# Patient Record
Sex: MALE | Race: WHITE | NOT HISPANIC OR LATINO
[De-identification: names, ages, dates, MRNs, and addresses within clinical notes are randomized per-mention and may not be internally consistent; named-entity substitution may affect disease eponyms.]

---

## 2022-08-08 PROBLEM — Z00.00 ENCOUNTER FOR PREVENTIVE HEALTH EXAMINATION: Status: ACTIVE | Noted: 2022-08-08

## 2022-08-10 ENCOUNTER — APPOINTMENT (OUTPATIENT)
Dept: ORTHOPEDIC SURGERY | Facility: CLINIC | Age: 37
End: 2022-08-10

## 2022-08-10 VITALS — BODY MASS INDEX: 21.17 KG/M2 | HEIGHT: 74 IN | WEIGHT: 165 LBS

## 2022-08-10 DIAGNOSIS — S73.004S UNSPECIFIED DISLOCATION OF RIGHT HIP, SEQUELA: ICD-10-CM

## 2022-08-10 DIAGNOSIS — M25.572 PAIN IN RIGHT ANKLE AND JOINTS OF RIGHT FOOT: ICD-10-CM

## 2022-08-10 DIAGNOSIS — M25.571 PAIN IN RIGHT ANKLE AND JOINTS OF RIGHT FOOT: ICD-10-CM

## 2022-08-10 DIAGNOSIS — Z78.9 OTHER SPECIFIED HEALTH STATUS: ICD-10-CM

## 2022-08-10 DIAGNOSIS — Z72.0 TOBACCO USE: ICD-10-CM

## 2022-08-10 PROCEDURE — 99203 OFFICE O/P NEW LOW 30 MIN: CPT

## 2022-08-10 RX ORDER — IBUPROFEN 600 MG/1
600 TABLET, FILM COATED ORAL
Qty: 28 | Refills: 0 | Status: COMPLETED | COMMUNITY
Start: 2022-08-07

## 2022-08-12 NOTE — HISTORY OF PRESENT ILLNESS
[de-identified] : 37M RHD. PMHX Pulled RT inner thigh/ groin muscle 2019. Felt like he "dislocated his hip" when getting up from chair - no trauma. Went to [place] on 8/7/22 For groin/ hip pain that seems to be progressively getting worse since the injury. States he has been compensating more with the LT. He was also seen for his ankles. They did xrays of both and found no fractures. He was told to follow up with ortho. No numbness/tingling.

## 2022-08-12 NOTE — IMAGING
[de-identified] : Bilateral hips with no swelling. +ttp at groin. Able to ambulate, +ttp at Achilles. +TA, EHL, GA. SILT throughout.\par \par Bilateral hip radiographs from [facility] with no fracture nor dislocation. No arthrosis.

## 2022-08-12 NOTE — ASSESSMENT
[FreeTextEntry1] : Bilateral adductor hip strain and Achilles tendonitis - reviewed radiographs and pathoanatomy with patient and discussed we will pursue PT. NSAIDs prn, WBAT.\par \par F/u prn

## 2022-08-18 ENCOUNTER — APPOINTMENT (OUTPATIENT)
Dept: ORTHOPEDIC SURGERY | Facility: CLINIC | Age: 37
End: 2022-08-18
Payer: COMMERCIAL

## 2022-08-18 VITALS — HEIGHT: 74 IN | BODY MASS INDEX: 21.17 KG/M2 | WEIGHT: 165 LBS

## 2022-08-18 PROCEDURE — 99203 OFFICE O/P NEW LOW 30 MIN: CPT

## 2022-08-18 PROCEDURE — 99213 OFFICE O/P EST LOW 20 MIN: CPT

## 2022-08-18 NOTE — HISTORY OF PRESENT ILLNESS
[de-identified] : 08/10/2022 Saw Dieter Fuller -- 37M RHD. PMHX Pulled RT inner thigh/ groin muscle 2019. Felt like he "dislocated his hip" when getting up from chair - no trauma. Went to [place] on 8/7/22 For groin/ hip pain that seems to be progressively getting worse since the injury. States he has been compensating more with the LT. He was also seen for his ankles. They did xrays of both and found no fractures. He was told to follow up with ortho. No numbness/tingling.\par \par 08/18/2022 Felt pain from his big toe on the rt foot to the heel. Was sent to pt per last visit but Pt was not comfortable working on him without additional imaging because the pain tends to move around. Feels pulling on his groin that makes it hard to urinate when it's bothering him.

## 2022-08-18 NOTE — ASSESSMENT
[FreeTextEntry1] : Bilateral adductor hip strain and Achilles tendonitis - reviewed radiographs and pathoanatomy with patient and discussed we will pursue PT. NSAIDs prn, WBAT.\par \par Will obtain MRI of the right hip w/o contrast.\par \par f/u after MRI.

## 2022-08-18 NOTE — IMAGING
[de-identified] : 08/18/2022 's findings Bilateral hips with no swelling. +ttp at groin. Able to ambulate, +ttp at Achilles. +TA, EHL, GA. SILT throughout.\par Bilateral hip radiographs from [facility] with no fracture nor dislocation. No arthrosis.

## 2022-08-23 ENCOUNTER — FORM ENCOUNTER (OUTPATIENT)
Age: 37
End: 2022-08-23

## 2022-08-24 ENCOUNTER — APPOINTMENT (OUTPATIENT)
Dept: MRI IMAGING | Facility: CLINIC | Age: 37
End: 2022-08-24

## 2022-08-24 PROCEDURE — 73721 MRI JNT OF LWR EXTRE W/O DYE: CPT | Mod: RT

## 2022-08-29 ENCOUNTER — APPOINTMENT (OUTPATIENT)
Dept: ORTHOPEDIC SURGERY | Facility: CLINIC | Age: 37
End: 2022-08-29

## 2022-08-29 VITALS — HEIGHT: 74 IN | BODY MASS INDEX: 21.17 KG/M2 | WEIGHT: 165 LBS

## 2022-08-29 DIAGNOSIS — S33.8XXA SPRAIN OF OTHER PARTS OF LUMBAR SPINE AND PELVIS, INITIAL ENCOUNTER: ICD-10-CM

## 2022-08-29 DIAGNOSIS — M76.60 ACHILLES TENDINITIS, UNSPECIFIED LEG: ICD-10-CM

## 2022-08-29 PROCEDURE — 99214 OFFICE O/P EST MOD 30 MIN: CPT

## 2022-08-29 NOTE — HISTORY OF PRESENT ILLNESS
[de-identified] : 08/10/2022 Saw Dieter Fuller -- 37M RHD. PMHX Pulled RT inner thigh/ groin muscle 2019. Felt like he "dislocated his hip" when getting up from chair - no trauma. Went to [place] on 8/7/22 For groin/ hip pain that seems to be progressively getting worse since the injury. States he has been compensating more with the LT. He was also seen for his ankles. They did xrays of both and found no fractures. He was told to follow up with ortho. No numbness/tingling.\par \par 08/18/2022 Felt pain from his big toe on the rt foot to the heel. Was sent to pt per last visit but Pt was not comfortable working on him without additional imaging because the pain tends to move around. Feels pulling on his groin that makes it hard to urinate when it's bothering him. \par \par 8/29/22 Follow up right hip MRI Results at [facility]. Currently experiencing pain from sitting while waiting. Reports his right leg has been numb for the past week. Experiencing pain and pulling sensation in his penis that is getting worse. Has noticed bruising on his penis for 2-3 days. Has occasional pain while urinating and has felt pain in his urethra. Feels pulling in bilateral groin. Denies pain medication.

## 2022-09-02 ENCOUNTER — APPOINTMENT (OUTPATIENT)
Dept: MRI IMAGING | Facility: CLINIC | Age: 37
End: 2022-09-02

## 2022-09-09 ENCOUNTER — APPOINTMENT (OUTPATIENT)
Dept: ORTHOPEDIC SURGERY | Facility: CLINIC | Age: 37
End: 2022-09-09

## 2022-09-13 ENCOUNTER — FORM ENCOUNTER (OUTPATIENT)
Age: 37
End: 2022-09-13

## 2022-09-14 ENCOUNTER — APPOINTMENT (OUTPATIENT)
Dept: MRI IMAGING | Facility: CLINIC | Age: 37
End: 2022-09-14

## 2022-09-14 PROCEDURE — 73723 MRI JOINT LWR EXTR W/O&W/DYE: CPT | Mod: RT

## 2022-09-19 ENCOUNTER — APPOINTMENT (OUTPATIENT)
Dept: ORTHOPEDIC SURGERY | Facility: CLINIC | Age: 37
End: 2022-09-19

## 2022-09-19 VITALS — HEIGHT: 74 IN | WEIGHT: 165 LBS | BODY MASS INDEX: 21.17 KG/M2

## 2022-09-19 PROCEDURE — 99213 OFFICE O/P EST LOW 20 MIN: CPT

## 2022-09-19 NOTE — ASSESSMENT
[FreeTextEntry1] : Bilateral adductor hip strain and Achilles tendonitis - reviewed radiographs and pathoanatomy with patient and discussed we will pursue PT. NSAIDs prn, WBAT.\par \par Reviewed MRI and explained. \par \par Due to symptoms and reviewing MRI images/Report, this may be a urologic issue instead of an orthopedic issue.\par \par Patient to f/u with urology and general surgery.\par \par f/u with orthopedics PRN.

## 2022-09-19 NOTE — HISTORY OF PRESENT ILLNESS
[de-identified] : 08/10/2022 Saw Dieter Fuller -- 37M RHD. PMHX Pulled RT inner thigh/ groin muscle 2019. Felt like he "dislocated his hip" when getting up from chair - no trauma. Went to [place] on 8/7/22 For groin/ hip pain that seems to be progressively getting worse since the injury. States he has been compensating more with the LT. He was also seen for his ankles. They did xrays of both and found no fractures. He was told to follow up with ortho. No numbness/tingling.\par \par 08/18/2022 Felt pain from his big toe on the rt foot to the heel. Was sent to pt per last visit but Pt was not comfortable working on him without additional imaging because the pain tends to move around. Feels pulling on his groin that makes it hard to urinate when it's bothering him. \par \par 8/29/22 Follow up right hip MRI Results at [facility]. Currently experiencing pain from sitting while waiting. Reports his right leg has been numb for the past week. Experiencing pain and pulling sensation in his penis that is getting worse. Has noticed bruising on his penis for 2-3 days. Has occasional pain while urinating and has felt pain in his urethra. Feels pulling in bilateral groin. Denies pain medication.\par \par 09/19/22 Pt is following up today on his RT hip MRI with contrast. States hes getting a lot more shooting pains on both sides of is penis. It is a lot more frequent. Stretching does help. Has noticed some pain in his RT hip joint to tailbone. Did try a double hernia girdle that seems to help. Also trying compression shorts. States even sitting to drive hurts.

## 2022-09-19 NOTE — DATA REVIEWED
-22  Baby Boy  GDM in this pregnancy    [MRI] : MRI [Right] : of the right [Hip] : hip [Report was reviewed and noted in the chart] : The report was reviewed and noted in the chart [I reviewed the films/CD and agree] : I reviewed the films/CD and agree [FreeTextEntry1] : Impression: \par 1. 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 Gluteal tendinopathy with insertional fraying and low-grade tear. No bursitis.\par 3. 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

## 2023-11-27 ENCOUNTER — APPOINTMENT (OUTPATIENT)
Dept: ORTHOPEDIC SURGERY | Facility: CLINIC | Age: 38
End: 2023-11-27
Payer: COMMERCIAL

## 2023-11-27 VITALS — BODY MASS INDEX: 16.68 KG/M2 | WEIGHT: 130 LBS | HEIGHT: 74 IN

## 2023-11-27 DIAGNOSIS — M25.552 PAIN IN RIGHT HIP: ICD-10-CM

## 2023-11-27 DIAGNOSIS — M25.551 PAIN IN RIGHT HIP: ICD-10-CM

## 2023-11-27 PROCEDURE — 73502 X-RAY EXAM HIP UNI 2-3 VIEWS: CPT

## 2023-11-27 PROCEDURE — 99203 OFFICE O/P NEW LOW 30 MIN: CPT

## 2024-11-04 ENCOUNTER — APPOINTMENT (OUTPATIENT)
Dept: RHEUMATOLOGY | Facility: CLINIC | Age: 39
End: 2024-11-04

## 2024-12-03 ENCOUNTER — APPOINTMENT (OUTPATIENT)
Dept: RHEUMATOLOGY | Facility: CLINIC | Age: 39
End: 2024-12-03